# Patient Record
Sex: MALE | Race: WHITE | NOT HISPANIC OR LATINO | Employment: UNEMPLOYED | URBAN - NONMETROPOLITAN AREA
[De-identification: names, ages, dates, MRNs, and addresses within clinical notes are randomized per-mention and may not be internally consistent; named-entity substitution may affect disease eponyms.]

---

## 2022-12-19 ENCOUNTER — OFFICE VISIT (OUTPATIENT)
Dept: URGENT CARE | Facility: CLINIC | Age: 42
End: 2022-12-19
Payer: MEDICARE

## 2022-12-19 VITALS
HEART RATE: 57 BPM | RESPIRATION RATE: 12 BRPM | WEIGHT: 185 LBS | HEIGHT: 71 IN | OXYGEN SATURATION: 95 % | TEMPERATURE: 97.1 F | DIASTOLIC BLOOD PRESSURE: 68 MMHG | SYSTOLIC BLOOD PRESSURE: 102 MMHG | BODY MASS INDEX: 25.9 KG/M2

## 2022-12-19 DIAGNOSIS — J02.0 PHARYNGITIS DUE TO STREPTOCOCCUS SPECIES: ICD-10-CM

## 2022-12-19 LAB
INT CON NEG: NEGATIVE
INT CON POS: POSITIVE
S PYO AG THROAT QL: POSITIVE

## 2022-12-19 PROCEDURE — 99203 OFFICE O/P NEW LOW 30 MIN: CPT | Performed by: PHYSICIAN ASSISTANT

## 2022-12-19 PROCEDURE — 87880 STREP A ASSAY W/OPTIC: CPT | Performed by: PHYSICIAN ASSISTANT

## 2022-12-19 RX ORDER — AMOXICILLIN 500 MG/1
500 CAPSULE ORAL 2 TIMES DAILY
Qty: 20 CAPSULE | Refills: 0 | Status: SHIPPED | OUTPATIENT
Start: 2022-12-19 | End: 2022-12-29

## 2022-12-19 RX ORDER — ZONISAMIDE 100 MG/1
CAPSULE ORAL
COMMUNITY
Start: 2022-11-26

## 2022-12-19 NOTE — LETTER
December 19, 2022         Patient: Raz Zepeda   YOB: 1980   Date of Visit: 12/19/2022           To Whom it May Concern:    Raz Zepeda was seen in my clinic on 12/19/2022.  He tested positive for strep throat in the urgent care setting today.  He may return to work on 12/21/2022, once he has been on antibiotics for a full 24 hours.    If you have any questions or concerns, please don't hesitate to call.        Sincerely,           Margarita Florentino P.A.-C.  Electronically Signed

## 2022-12-20 ASSESSMENT — ENCOUNTER SYMPTOMS
FEVER: 0
NECK PAIN: 1
DIZZINESS: 0
NAUSEA: 0
SORE THROAT: 1
CHILLS: 0
VOMITING: 0

## 2022-12-20 NOTE — PROGRESS NOTES
Subjective     Raz Zepeda is a 42 y.o. male who presents with Neck Pain ((R) side of neck/only when touching or stretching/started this morning )    HPI:  Raz Zepeda is a 42 y.o. male who presents today for evaluation of neck pain.  Patient reports that he developed a painful bump on the right side of his neck this morning.  He also had sore throat this morning when he woke up.  Says that now, however, the pain in his neck seems to be worse when he stretches looks to the left.  He has not had any fever.  Has not taken any medications for symptoms.  Denies any injury.      Review of Systems   Constitutional:  Negative for chills, fever and malaise/fatigue.   HENT:  Positive for sore throat. Negative for congestion.    Gastrointestinal:  Negative for nausea and vomiting.   Musculoskeletal:  Positive for neck pain.   Neurological:  Negative for dizziness.           PMH:  has a past medical history of Bronchitis, Meningitis, and Seizure disorder.    He has no past medical history of ASTHMA, CAD (coronary artery disease), Cancer, Congestive heart failure, COPD, Diabetes, Hypertension, Infectious disease, Liver disease, Psychiatric disorder, Renal disorder, or Stroke.  MEDS:   Current Outpatient Medications:     zonisamide (ZONEGRAN) 100 MG Cap, 3 CAPSULE BY MOUTH TWICE A DAY, Disp: , Rfl:     levETIRAcetam (KEPPRA PO), Take  by mouth., Disp: , Rfl:     amoxicillin (AMOXIL) 500 MG Cap, Take 1 Capsule by mouth 2 times a day for 10 days., Disp: 20 Capsule, Rfl: 0    topiramate (TOPAMAX) 200 MG tablet, Take 1 Tab by mouth 2 times a day. TWICE DAILY, Disp: 30 Tab, Rfl: 0    divalproex (DEPAKOTE) 500 MG Tablet Delayed Response, Take 1 in morning and 2 at night, Disp: 50 Tab, Rfl: 0    gabapentin (NEURONTIN) 400 MG Cap, Take 1 Cap by mouth 3 times a day. (Patient not taking: Reported on 12/19/2022), Disp: 50 Cap, Rfl: 0    divalproex ER (DEPAKOTE ER) 500 MG TB24, TAKE 1 TABLET BY MOUTH IN THE MORNING  "AND 2 TABLETS AT NIGHT (Patient not taking: Reported on 12/19/2022), Disp: 84 Tab, Rfl: 0  ALLERGIES:   Allergies   Allergen Reactions    Nkda [No Known Drug Allergy]      SURGHX:   Past Surgical History:   Procedure Laterality Date    OTHER ORTHOPEDIC SURGERY      rgt arm open reduction     SOCHX:  reports that he has never smoked. He does not have any smokeless tobacco history on file. He reports that he does not drink alcohol and does not use drugs.  FH: Family history was reviewed, no pertinent findings to report      Objective     /68 (BP Location: Left arm, Patient Position: Sitting, BP Cuff Size: Adult)   Pulse (!) 57   Temp 36.2 °C (97.1 °F) (Temporal)   Resp 12   Ht 1.803 m (5' 11\")   Wt 83.9 kg (185 lb)   SpO2 95%   BMI 25.80 kg/m²      Physical Exam  Constitutional:       Appearance: He is well-developed.   HENT:      Head: Normocephalic and atraumatic.      Right Ear: Ear canal and external ear normal.      Left Ear: Tympanic membrane, ear canal and external ear normal.      Nose: Nose normal.      Mouth/Throat:      Lips: Pink.      Mouth: Mucous membranes are moist.      Pharynx: Posterior oropharyngeal erythema present.   Eyes:      Conjunctiva/sclera: Conjunctivae normal.      Pupils: Pupils are equal, round, and reactive to light.   Cardiovascular:      Rate and Rhythm: Normal rate and regular rhythm.      Heart sounds: Normal heart sounds. No murmur heard.  Pulmonary:      Effort: Pulmonary effort is normal.      Breath sounds: Normal breath sounds. No wheezing.   Musculoskeletal:      Cervical back: Normal range of motion.   Lymphadenopathy:      Cervical: Cervical adenopathy present.   Skin:     General: Skin is warm and dry.      Capillary Refill: Capillary refill takes less than 2 seconds.   Neurological:      Mental Status: He is alert and oriented to person, place, and time.   Psychiatric:         Behavior: Behavior normal.         Judgment: Judgment normal.       POCT Rapid " Strep A - POSITIVE    Assessment & Plan       1. Pharyngitis due to Streptococcus species  - POCT Rapid Strep A  - amoxicillin (AMOXIL) 500 MG Cap; Take 1 Capsule by mouth 2 times a day for 10 days.  Dispense: 20 Capsule; Refill: 0  Patient with complaints of sore throat and neck pain.  Swollen bump in neck is consistent with lymphadenopathy and patient did test positive for strep pharyngitis.  If lymph node is still prominent and tender after 2 weeks he should return for reevaluation or follow-up with his primary care provider.  -Supportive care discussed to include salt water gargles, throat lozenges, and increased fluid intake  - Tylenol or ibuprofen as needed for fever > 100.4 F  Discussed with patient that they are contagious until they been on the antibiotics for at least 24 hours.  Also recommend that they switch their toothbrush out after being on the antibiotics for 2 to 3 days.          Differential Diagnosis, natural history, and supportive care discussed. Return to the Urgent Care or follow up with your PCP if symptoms fail to resolve, or for any new or worsening symptoms. Emergency room precautions discussed. Patient and/or family appears understanding of information.

## 2024-09-29 ENCOUNTER — APPOINTMENT (OUTPATIENT)
Dept: URGENT CARE | Facility: CLINIC | Age: 44
End: 2024-09-29
Payer: MEDICARE